# Patient Record
Sex: FEMALE | Race: OTHER
[De-identification: names, ages, dates, MRNs, and addresses within clinical notes are randomized per-mention and may not be internally consistent; named-entity substitution may affect disease eponyms.]

---

## 2018-09-22 ENCOUNTER — HOSPITAL ENCOUNTER (EMERGENCY)
Dept: HOSPITAL 56 - MW.ED | Age: 33
Discharge: HOME | End: 2018-09-22
Payer: COMMERCIAL

## 2018-09-22 DIAGNOSIS — Z88.0: ICD-10-CM

## 2018-09-22 DIAGNOSIS — K64.5: Primary | ICD-10-CM

## 2018-09-22 DIAGNOSIS — Z79.899: ICD-10-CM

## 2018-09-22 NOTE — EDM.PDOC
ED HPI GENERAL MEDICAL PROBLEM





- General


Chief Complaint: General


Stated Complaint: PERSONAL PROBLEM


Time Seen by Provider: 09/22/18 11:46


Source of Information: Reports: Patient


History Limitations: Reports: No Limitations





- History of Present Illness


INITIAL COMMENTS - FREE TEXT/NARRATIVE: 


HISTORY AND PHYSICAL:





History of present illness:


Patient is a 32-year-old female who presents to the emergency room with 

complaints of right pain. She states she has had hemorrhoids in the past which 

she has noticed her bowel movements have not been painful and resolved on her 

own. The right has been causing increased pain with standing and weightbearing 

and has been ongoing for 3-4 days. She denies any fever, chills, chest pain, 

shortness of breath or cough. Abdominal pain, nausea, vomiting, diarrhea or 

constipation. No changes in her bowel pattern or blood in her stools.





Review of systems: 


As per history of present illness and below otherwise all systems reviewed and 

negative.





Past medical history: 


As per history of present illness and as reviewed below otherwise 

noncontributory.





Surgical history: 


As per history of present illness and as reviewed below otherwise 

noncontributory.





Social history: 


No reported history of drug or alcohol abuse.





Family history: 


As per history of present illness and as reviewed below otherwise 

noncontributory.





Physical exam:


General: Well-developed and well-nourished 32-year-old female. Nontoxic 

appearing and in no acute distress.


HEENT: Atraumatic, normocephalic, pupils equal and reactive bilaterally, 

negative for conjunctival pallor or scleral icterus, mucous membranes moist, 

throat clear, neck supple, nontender, trachea midline. No drooling or trismus 

noted. No meningeal signs


Lungs: Clear to auscultation, breath sounds equal bilaterally, chest nontender.


Heart: S1S2, regular rate and rhythm without overt murmur


Abdomen: Soft, nondistended, nontender. Negative for masses or 

hepatosplenomegaly. Negative for costovertebral tenderness.


Pelvis: Stable nontender.


Genitourinary: Deferred.


Rectal: Good rectal tone. External hemorrhoid noted at the 6 o'clock position, 

approximately 7mm in diameter. Tender to touch


Skin: Intact, warm, dry. No lesions or rashes noted.


Extremities: Atraumatic, negative for cords or calf pain. Neurovascular 

unremarkable.


Neuro: Awake, alert, oriented. Cranial nerves II through XII unremarkable. 

Cerebellum unremarkable. Motor and sensory unremarkable throughout. Exam 

nonfocal.





Notes:


Area was cleansed with betadine swabs before lidocaine 1% was used to 

anesthetize the area. One mL was used, patient tolerated well. A small clot was 

expressed from the thrombosed hemorrhoid. Patient did have relief with this. 

This. She voices understanding and is agreeable to plan of care. Further 

questions or concerns at this time.





Diagnostics:


Nnne





Therapeutics:


Lidocaine with Epi





Prescription:


Anusol Cream





Impression: 


Thrombosed Hemorrhoid





Plan:


1. Please take a stool softener to avoid straining with bowel movements. Use a 

donut cut out for comfort while sitting.


2. Tylenol and/or ibuprofen as needed for pain management. Epsom salt soaks 1-2 

x daily.


3. Take the anusol as prescribed.


4. Please follow up with the general surgeon for further care next week. Return 

to the ED as needed and as discussed.





Definitive disposition and diagnosis as appropriate pending reevaluation and 

review of above.





Duration: Day(s):


  ** Rectal


Pain Score (Numeric/FACES): 6





- Related Data


 Allergies











Allergy/AdvReac Type Severity Reaction Status Date / Time


 


Penicillins Allergy  Rash Verified 09/22/18 11:57











Home Meds: 


 Home Meds





Hydrocortisone [Anusol-HC] 30 gm RC QID #1 cream..g. 09/22/18 [Rx]


predniSONE [Prednisone] 1 tab PO DAILY 09/22/18 [History]











ED ROS GENERAL





- Review of Systems


Review Of Systems: ROS reveals no pertinent complaints other than HPI.





ED EXAM, GENERAL





- Physical Exam


Exam: See Below (SEe dictation)





Course





- Vital Signs


Last Recorded V/S: 


 Last Vital Signs











Temp  98.1 F   09/22/18 11:53


 


Pulse  64   09/22/18 11:53


 


Resp  18   09/22/18 11:53


 


BP  132/76   09/22/18 11:53


 


Pulse Ox  99   09/22/18 11:53














- Orders/Labs/Meds


Meds: 


Medications














Discontinued Medications














Generic Name Dose Route Start Last Admin





  Trade Name Freq  PRN Reason Stop Dose Admin


 


Lidocaine HCl  5 ml  09/22/18 12:03  09/22/18 12:19





  Xylocaine-Mpf 1%  INJECT  09/22/18 12:04  Not Given





  ONETIME ONE   





     





     





     





     


 


Lidocaine/Epinephrine  10 ml  09/22/18 12:08  09/22/18 12:19





  Xylocaine 1% With Epinephrine 1:100,000  INJECT  09/22/18 12:09  Not Given





  ONETIME ONE   





     





     





     





     


 


Lidocaine/Epinephrine  20 ml  09/22/18 12:14  09/22/18 12:19





  Xylocaine 1% With Epinephrine 1:100,000  INJECT  09/22/18 12:15  20 ml





  ONETIME ONE   Administration





     





     





     





     


 


Lidocaine/Epinephrine  Confirm  09/22/18 12:14  09/22/18 12:19





  Xylocaine 1% With Epinephrine 1:100,000  Administered  09/22/18 12:15  Not 

Given





  Dose   





  20 ml   





  .ROUTE   





  .STK-MED ONE   





     





     





     





     














Departure





- Departure


Time of Disposition: 12:32


Disposition: Home, Self-Care 01


Clinical Impression: 


 Hemorrhoid thrombosis








- Discharge Information


Prescriptions: 


Hydrocortisone [Anusol-HC] 30 gm RC QID #1 cream..g.


Instructions:  Hemorrhoids, Easy-to-Read


Referrals: 


PCP,None [Primary Care Provider] - 


Forms:  ED Department Discharge


Additional Instructions: 


The following information is given to patients seen in the emergency department 

who are being discharged to home. This information is to outline your options 

for follow-up care. We provide all patients seen in our emergency department 

with a follow-up referral.





The need for follow-up, as well as the timing and circumstances, are variable 

depending upon the specifics of your emergency department visit.





If you don't have a primary care physician on staff, we will provide you with a 

referral. We always advise you to contact your personal physician following an 

emergency department visit to inform them of the circumstance of the visit and 

for follow-up with them and/or the need for any referrals to a consulting 

specialist.





The emergency department will also refer you to a specialist when appropriate. 

This referral assures that you have the opportunity for follow-up care with a 

specialist. All of these measure are taken in an effort to provide you with 

optimal care, which includes your follow-up.





Under all circumstances we always encourage you to contact your private 

physician who remains a resource for coordinating your care. When calling for 

follow-up care, please make the office aware that this follow-up is from your 

recent emergency room visit. If for any reason you are refused follow-up, 

please contact the [facility] Emergency 

Department at [phone] and asked to speak to the emergency department 

charge nurse.





[facility]


Primary Care


[address] [address]


Phone: [phone]


Fax: [phone]





[facility]


Specialty Care - General Surgery


[address]


[address] [place] [address], [address]


[place] [address]


Phone: [phone]


Fax: [phone]





1. Please take a stool softener to avoid straining with bowel movements. Use a 

donut cut out for comfort while sitting.


2. Tylenol and/or ibuprofen as needed for pain management. Epsom salt soaks 1-2 

x daily.


3. Take the Anusol as prescribed.


4. Please follow up with the general surgeon for further care next week. Return 

to the ED as needed and as discussed.